# Patient Record
Sex: FEMALE | Race: WHITE | Employment: FULL TIME | URBAN - METROPOLITAN AREA
[De-identification: names, ages, dates, MRNs, and addresses within clinical notes are randomized per-mention and may not be internally consistent; named-entity substitution may affect disease eponyms.]

---

## 2022-08-17 ENCOUNTER — OFFICE VISIT (OUTPATIENT)
Dept: OBGYN CLINIC | Age: 42
End: 2022-08-17

## 2022-08-17 VITALS — BODY MASS INDEX: 25.57 KG/M2 | WEIGHT: 149.8 LBS | HEIGHT: 64 IN

## 2022-08-17 DIAGNOSIS — Z13.89 SCREENING FOR GENITOURINARY CONDITION: ICD-10-CM

## 2022-08-17 DIAGNOSIS — Z12.72 SCREENING FOR VAGINAL CANCER: ICD-10-CM

## 2022-08-17 DIAGNOSIS — Z12.31 ENCOUNTER FOR SCREENING MAMMOGRAM FOR BREAST CANCER: ICD-10-CM

## 2022-08-17 DIAGNOSIS — Z01.419 WELL WOMAN EXAM: Primary | ICD-10-CM

## 2022-08-17 LAB
BILIRUBIN, URINE, POC: ABNORMAL
BLOOD URINE, POC: ABNORMAL
GLUCOSE URINE, POC: ABNORMAL
KETONES, URINE, POC: ABNORMAL
LEUKOCYTE ESTERASE, URINE, POC: ABNORMAL
NITRITE, URINE, POC: POSITIVE
PH, URINE, POC: 5 (ref 4.6–8)
PROTEIN,URINE, POC: ABNORMAL
SPECIFIC GRAVITY, URINE, POC: 1.01 (ref 1–1.03)
URINALYSIS CLARITY, POC: ABNORMAL
URINALYSIS COLOR, POC: ABNORMAL
UROBILINOGEN, POC: ABNORMAL

## 2022-08-17 PROCEDURE — 81002 URINALYSIS NONAUTO W/O SCOPE: CPT | Performed by: OBSTETRICS & GYNECOLOGY

## 2022-08-17 PROCEDURE — 99396 PREV VISIT EST AGE 40-64: CPT | Performed by: OBSTETRICS & GYNECOLOGY

## 2022-08-17 RX ORDER — CIPROFLOXACIN 250 MG/1
250 TABLET, FILM COATED ORAL 2 TIMES DAILY
Qty: 14 TABLET | Refills: 0 | Status: SHIPPED | OUTPATIENT
Start: 2022-08-17 | End: 2022-08-24

## 2022-08-18 LAB
ESTRADIOL SERPL-MCNC: 71.57 PG/ML
T4 SERPL-MCNC: 7.5 UG/DL (ref 4.8–13.9)
TSH, 3RD GENERATION: 0.88 UIU/ML (ref 0.36–3.74)

## 2022-08-19 ENCOUNTER — TELEPHONE (OUTPATIENT)
Dept: OBGYN CLINIC | Age: 42
End: 2022-08-19

## 2022-08-19 LAB
BACTERIA SPEC CULT: ABNORMAL
SERVICE CMNT-IMP: ABNORMAL

## 2022-08-19 NOTE — TELEPHONE ENCOUNTER
----- Message from Adebayo Lake MD sent at 8/19/2022 12:32 PM EDT -----  Regarding: labs  All labs were wnl, except she did have a UTI, I gave her cipro and this will cover it.  Estrogen was wnl  ----- Message -----  From: Tim Melendrez Incoming [place] [facility]  Sent: 8/18/2022  11:44 AM EDT  To: Adebayo Lake MD

## 2022-08-20 LAB
TESTOST FREE SERPL-MCNC: 1.9 PG/ML (ref 0–4.2)
TESTOST SERPL-MCNC: 12 NG/DL (ref 4–50)

## 2022-08-23 ENCOUNTER — TELEPHONE (OUTPATIENT)
Dept: OBGYN CLINIC | Age: 42
End: 2022-08-23

## 2022-08-24 LAB
CYTOLOGIST CVX/VAG CYTO: NORMAL
CYTOLOGY CVX/VAG DOC THIN PREP: NORMAL
Lab: NORMAL
Lab: NORMAL
PATH REPORT.FINAL DX SPEC: NORMAL
STAT OF ADQ CVX/VAG CYTO-IMP: NORMAL

## 2022-09-01 ENCOUNTER — OFFICE VISIT (OUTPATIENT)
Dept: OBGYN CLINIC | Age: 42
End: 2022-09-01

## 2022-09-01 VITALS
DIASTOLIC BLOOD PRESSURE: 78 MMHG | SYSTOLIC BLOOD PRESSURE: 122 MMHG | WEIGHT: 147.8 LBS | BODY MASS INDEX: 25.23 KG/M2 | HEIGHT: 64 IN

## 2022-09-01 DIAGNOSIS — Z87.440 HISTORY OF UTI: ICD-10-CM

## 2022-09-01 DIAGNOSIS — R53.83 FATIGUE, UNSPECIFIED TYPE: Primary | ICD-10-CM

## 2022-09-01 LAB
BILIRUBIN, URINE, POC: NEGATIVE
BLOOD URINE, POC: NEGATIVE
GLUCOSE URINE, POC: NEGATIVE
KETONES, URINE, POC: NEGATIVE
LEUKOCYTE ESTERASE, URINE, POC: NEGATIVE
NITRITE, URINE, POC: NEGATIVE
PH, URINE, POC: 7 (ref 4.6–8)
PROTEIN,URINE, POC: NEGATIVE
SPECIFIC GRAVITY, URINE, POC: 1.02 (ref 1–1.03)
URINALYSIS CLARITY, POC: CLEAR
URINALYSIS COLOR, POC: YELLOW
UROBILINOGEN, POC: NORMAL

## 2022-09-01 PROCEDURE — 99214 OFFICE O/P EST MOD 30 MIN: CPT | Performed by: OBSTETRICS & GYNECOLOGY

## 2022-09-01 PROCEDURE — 81003 URINALYSIS AUTO W/O SCOPE: CPT | Performed by: OBSTETRICS & GYNECOLOGY

## 2022-09-01 NOTE — PROGRESS NOTES
The patient is a 36 y.o. [id number] who is seen to discuss HRT options. Pt had hormone levels checked on 8/17/22. Estradiol was (71.57) and Free Testosterone was (12). HISTORY:    [id number]  No LMP recorded (lmp unknown). Patient has had a hysterectomy. No current outpatient medications on file prior to visit. No current facility-administered medications on file prior to visit. ROS:  Feeling tired/fatigued. No dyspnea or chest pain on exertion. No abdominal pain, change in bowel habits, black or bloody stools. No urinary tract symptoms. GYN ROS: no breast pain or new or enlarging lumps on self exam.    PHYSICAL EXAM:  Height 5' 4\" (1.626 m), weight 147 lb 12.8 oz (67 kg), not currently breastfeeding. The patient appears well, alert, oriented x 3, in no distress. ASSESSMENT:    1. History of UTI  -     AMB POC URINALYSIS DIP STICK AUTO W/O MICRO     PLAN:  All questions answered, c/o cont fatigue.  Wants testosterone supplement  Blood tests: Basic metabolic panel, CBC with diff, and sedrate, cortisol  Diagnosis explained in detail, including differential  Follow-up as needed, urine wnl  Rx for testosterone troches 1mg q day # 30 5 refills

## 2022-09-02 LAB
ALBUMIN SERPL-MCNC: 4.2 G/DL (ref 3.5–5)
ALBUMIN/GLOB SERPL: 1.3 {RATIO} (ref 1.2–3.5)
ALP SERPL-CCNC: 46 U/L (ref 50–136)
ALT SERPL-CCNC: 18 U/L (ref 12–65)
ANION GAP SERPL CALC-SCNC: 3 MMOL/L (ref 4–13)
AST SERPL-CCNC: 6 U/L (ref 15–37)
BASOPHILS # BLD: 0 K/UL (ref 0–0.2)
BASOPHILS NFR BLD: 0 % (ref 0–2)
BILIRUB SERPL-MCNC: 0.5 MG/DL (ref 0.2–1.1)
BUN SERPL-MCNC: 9 MG/DL (ref 6–23)
CALCIUM SERPL-MCNC: 9.2 MG/DL (ref 8.3–10.4)
CHLORIDE SERPL-SCNC: 109 MMOL/L (ref 101–110)
CO2 SERPL-SCNC: 25 MMOL/L (ref 21–32)
CORTIS AM PEAK SERPL-MCNC: 5.9 UG/DL (ref 7–25)
CREAT SERPL-MCNC: 0.7 MG/DL (ref 0.6–1)
DIFFERENTIAL METHOD BLD: NORMAL
EOSINOPHIL # BLD: 0.1 K/UL (ref 0–0.8)
EOSINOPHIL NFR BLD: 1 % (ref 0.5–7.8)
ERYTHROCYTE [DISTWIDTH] IN BLOOD BY AUTOMATED COUNT: 12 % (ref 11.9–14.6)
GLOBULIN SER CALC-MCNC: 3.2 G/DL (ref 2.3–3.5)
GLUCOSE SERPL-MCNC: 91 MG/DL (ref 65–100)
HCT VFR BLD AUTO: 39.6 % (ref 35.8–46.3)
HGB BLD-MCNC: 12.9 G/DL (ref 11.7–15.4)
IMM GRANULOCYTES # BLD AUTO: 0 K/UL (ref 0–0.5)
IMM GRANULOCYTES NFR BLD AUTO: 0 % (ref 0–5)
LYMPHOCYTES # BLD: 2.1 K/UL (ref 0.5–4.6)
LYMPHOCYTES NFR BLD: 28 % (ref 13–44)
MCH RBC QN AUTO: 31.6 PG (ref 26.1–32.9)
MCHC RBC AUTO-ENTMCNC: 32.6 G/DL (ref 31.4–35)
MCV RBC AUTO: 97.1 FL (ref 79.6–97.8)
MONOCYTES # BLD: 0.4 K/UL (ref 0.1–1.3)
MONOCYTES NFR BLD: 5 % (ref 4–12)
NEUTS SEG # BLD: 5 K/UL (ref 1.7–8.2)
NEUTS SEG NFR BLD: 66 % (ref 43–78)
NRBC # BLD: 0 K/UL (ref 0–0.2)
PLATELET # BLD AUTO: 253 K/UL (ref 150–450)
PMV BLD AUTO: 10.7 FL (ref 9.4–12.3)
POTASSIUM SERPL-SCNC: 4.5 MMOL/L (ref 3.5–5.1)
PROT SERPL-MCNC: 7.4 G/DL (ref 6.3–8.2)
RBC # BLD AUTO: 4.08 M/UL (ref 4.05–5.2)
SODIUM SERPL-SCNC: 137 MMOL/L (ref 136–145)
WBC # BLD AUTO: 7.6 K/UL (ref 4.3–11.1)

## 2022-09-08 ENCOUNTER — TELEPHONE (OUTPATIENT)
Dept: OBGYN CLINIC | Age: 42
End: 2022-09-08

## 2022-09-08 NOTE — TELEPHONE ENCOUNTER
----- Message from Mayela Mercedes MD sent at 9/8/2022 11:18 AM EDT -----  Regarding: labs  All of her fatigue labs were wnl, so stay on the testosterone  ----- Message -----  From: Tim Melendrez Incoming [place] [facility]  Sent: 9/2/2022   7:29 AM EDT  To: Mayela Mercedes MD

## 2022-10-03 ENCOUNTER — NURSE ONLY (OUTPATIENT)
Dept: OBGYN CLINIC | Age: 42
End: 2022-10-03

## 2022-10-03 DIAGNOSIS — R30.0 BURNING WITH URINATION: ICD-10-CM

## 2022-10-03 DIAGNOSIS — M54.50 LOW BACK PAIN, UNSPECIFIED BACK PAIN LATERALITY, UNSPECIFIED CHRONICITY, UNSPECIFIED WHETHER SCIATICA PRESENT: ICD-10-CM

## 2022-10-03 DIAGNOSIS — R30.0 DYSURIA: ICD-10-CM

## 2022-10-03 DIAGNOSIS — R39.9 UTI SYMPTOMS: Primary | ICD-10-CM

## 2022-10-03 DIAGNOSIS — R39.9 UTI SYMPTOMS: ICD-10-CM

## 2022-10-03 LAB
BILIRUBIN, URINE, POC: NEGATIVE
BLOOD URINE, POC: NEGATIVE
GLUCOSE URINE, POC: NEGATIVE
KETONES, URINE, POC: NEGATIVE
LEUKOCYTE ESTERASE, URINE, POC: NEGATIVE
NITRITE, URINE, POC: NEGATIVE
PH, URINE, POC: 6.5 (ref 4.6–8)
PROTEIN,URINE, POC: NEGATIVE
SPECIFIC GRAVITY, URINE, POC: 1.03 (ref 1–1.03)
URINALYSIS CLARITY, POC: CLEAR
URINALYSIS COLOR, POC: YELLOW
UROBILINOGEN, POC: NORMAL

## 2022-10-03 PROCEDURE — 99211 OFF/OP EST MAY X REQ PHY/QHP: CPT | Performed by: OBSTETRICS & GYNECOLOGY

## 2022-10-03 PROCEDURE — 81003 URINALYSIS AUTO W/O SCOPE: CPT | Performed by: OBSTETRICS & GYNECOLOGY

## 2022-10-03 NOTE — PROGRESS NOTES
GYN patient is here for a urine check. She complains of UTI symptoms of painful urination, burning with urination and back pain. UA wan negative today in the office. Spoke with Holli Sorto, [facility] and she advised her to increase hydration and she can take AZO for the discomfort and burning. Urine sent for culture and she will be notified of results when they are available. Orders Placed This Encounter   Procedures    Culture, Urine     Standing Status:   Future     Standing Expiration Date:   10/3/2023     Order Specific Question:   Specify (ex-cath, midstream, cysto, etc)?      Answer:   Mid- stream    AMB POC URINALYSIS DIP STICK AUTO W/O MICRO

## 2022-10-06 LAB
BACTERIA SPEC CULT: NORMAL
SERVICE CMNT-IMP: NORMAL

## 2022-10-27 ENCOUNTER — NURSE ONLY (OUTPATIENT)
Dept: OBGYN CLINIC | Age: 42
End: 2022-10-27

## 2022-10-27 DIAGNOSIS — R39.9 UTI SYMPTOMS: Primary | ICD-10-CM

## 2022-10-27 DIAGNOSIS — R35.0 FREQUENCY OF URINATION: ICD-10-CM

## 2022-10-27 DIAGNOSIS — R39.15 URGENCY OF URINATION: ICD-10-CM

## 2022-10-27 DIAGNOSIS — R30.0 DYSURIA: ICD-10-CM

## 2022-10-27 LAB
BILIRUBIN, URINE, POC: NEGATIVE
BLOOD URINE, POC: NORMAL
GLUCOSE URINE, POC: NEGATIVE
KETONES, URINE, POC: NEGATIVE
LEUKOCYTE ESTERASE, URINE, POC: NEGATIVE
NITRITE, URINE, POC: NEGATIVE
PH, URINE, POC: 7 (ref 4.6–8)
PROTEIN,URINE, POC: NEGATIVE
SPECIFIC GRAVITY, URINE, POC: 1.01 (ref 1–1.03)
URINALYSIS CLARITY, POC: CLEAR
URINALYSIS COLOR, POC: YELLOW
UROBILINOGEN, POC: NORMAL

## 2022-10-27 PROCEDURE — 81003 URINALYSIS AUTO W/O SCOPE: CPT | Performed by: OBSTETRICS & GYNECOLOGY

## 2022-10-27 NOTE — PROGRESS NOTES
GYN Patient is here for a urine check. She complains of frequency, urgency and dysuria. Urine sent for culture. UA today showed trace blood. We will call her with results when they become available. If culture is negative we will send a referral.  Orders Placed This Encounter   Procedures    Culture, Urine     Standing Status:   Future     Standing Expiration Date:   10/27/2023     Order Specific Question:   Specify (ex-cath, midstream, cysto, etc)?      Answer:   midstream    AMB POC URINALYSIS DIP STICK AUTO W/O MICRO

## 2022-10-30 LAB
BACTERIA SPEC CULT: NORMAL
SERVICE CMNT-IMP: NORMAL

## 2023-02-14 NOTE — PROGRESS NOTES
HPI:  Ms. Ben Ramirez is a 36 y.o.   OB History          4    Para   4    Term   4            AB        Living   4         SAB        IAB        Ectopic        Molar        Multiple        Live Births   4             New patient who is here today for a well woman exam. Urinalysis was positive for UTI. Patient is symptomatic. Urine culture will be sent. Total Abdominal Hysterectomy without Salpingo-Oophorectomy on 3/6/13 due to menorrhagia and dysmenorrhea. Pathology:   \"UTERUS\": CERVIX: CHRONIC CERVICITIS AND SQUAMOUS METAPLASIA. ENDOMETRIUM: INACTIVE BASALIS-TYPE ENDOMETRIUM. MYOMETRIUM: HISTOLOGICALLY UNREMARKABLE MYOMETRIUM. Date Performed Result   PAP Several years  No hx of abnormal pap, colposcopy or leep. Mammogram   Benign    Colonoscopy NA NA   Dexa NA NA     GYN History     Past Medical History:  Past Medical History:   Diagnosis Date    Dysmenorrhea     Menorrhagia with regular cycle        Past Surgical History:  Past Surgical History:   Procedure Laterality Date     SECTION      HYSTERECTOMY, TOTAL ABDOMINAL (CERVIX REMOVED)  2013       Allergies: Allergies   Allergen Reactions    Oxycodone-Acetaminophen Itching       Medication History:  No current outpatient medications on file. No current facility-administered medications for this visit.        Social History:  Social History     Socioeconomic History    Marital status:      Spouse name: Not on file    Number of children: Not on file    Years of education: Not on file    Highest education level: Not on file   Occupational History    Not on file   Tobacco Use    Smoking status: Former     Types: Cigarettes    Smokeless tobacco: Never   Substance and Sexual Activity    Alcohol use: Yes    Drug use: Never    Sexual activity: Yes     Partners: Male     Birth control/protection: Surgical   Other Topics Concern    Not on file   Social History Narrative    Not on file     Social Determinants of Health     Financial Resource Strain: Not on file   Food Insecurity: Not on file   Transportation Needs: Not on file   Physical Activity: Not on file   Stress: Not on file   Social Connections: Not on file   Intimate Partner Violence: Not on file   Housing Stability: Not on file       Family History:  Family History   Problem Relation Age of Onset    Heart Attack Father     Breast Cancer Maternal Grandmother     Prostate Cancer Neg Hx     Ovarian Cancer Neg Hx     Colon Cancer Neg Hx        Review of Systems - General ROS: negative except for that discussed in HPI      ROS:  Feeling well. No dyspnea or chest pain on exertion. No abdominal pain, change in bowel habits, black or bloody stools. No urinary tract symptoms. No neurological complaints. Objective:   Ht 5' 4\" (1.626 m)   Wt 149 lb 12.8 oz (67.9 kg)   LMP  (LMP Unknown)   Breastfeeding No   BMI 25.71 kg/m²     Results for orders placed or performed in visit on 08/17/22   AMB POC URINALYSIS DIP STICK MANUAL W/O MICRO   Result Value Ref Range    Color (UA POC) Red     Clarity (UA POC) Cloudy     Glucose, Urine, POC Trace Negative    Bilirubin, Urine, POC 1+ Negative    Ketones, Urine, POC Trace Negative    Specific Gravity, Urine, POC 1.010 1.001 - 1.035    Blood (UA POC) 2+ Negative    pH, Urine, POC 5.0 4.6 - 8.0    Protein, Urine, POC 3+ Negative    Urobilinogen, POC 4 mg/dL     Nitrite, Urine, POC Positive Negative    Leukocyte Esterase, Urine, POC 3+ Negative        The patient appears well, alert, oriented x 3, in no distress. ENT normal.  Neck supple. No adenopathy or thyromegaly. Lungs:  clear, good air entry, no wheezes, rhonchi or rales. Heart:  S1 and S2 normal, no murmurs, regular rate and rhythm. Abdomen:  soft without tenderness, guarding, mass or organomegaly. Extremities show no edema, normal peripheral pulses. Neurological is normal, no focal findings.     BREAST EXAM: breasts appear normal, no suspicious masses, no skin or nipple changes or axillary nodes, symmetric fibrous changes bilaterally    PELVIC EXAM: External genitalia is within normal limits, urethra, urethra meatus and bladder are midline well supported. Vagina is well rugated, yeast present/ has a prominent polyp arising from right cuff corner( papped over this )Cervix absent, pap done  Uterus is absent,  no ovarian masses palpated    Assessment/Plan:      Diagnosis Orders   1. Well woman exam  AMB POC URINALYSIS DIP STICK MANUAL W/O MICRO    Testosterone, free, total    Estradiol    TSH    T4    T4    TSH    Estradiol    Testosterone, free, total      2. Screening for genitourinary condition  AMB POC URINALYSIS DIP STICK MANUAL W/O MICRO    Culture, Urine    Culture, Urine      3. Encounter for screening mammogram for breast cancer  YUE YAMILET DIGITAL SCREEN BILATERAL    YUE YAMILET DIGITAL SCREEN BILATERAL      4. Screening for vaginal cancer  PAP (Image Guided), Liquid-based    PAP (Image Guided), Liquid-based        Encounter Diagnoses   Name Primary? Well woman exam Yes    Screening for genitourinary condition     Encounter for screening mammogram for breast cancer     Screening for vaginal cancer      Orders Placed This Encounter   Procedures    Culture, Urine     Standing Status:   Future     Number of Occurrences:   1     Standing Expiration Date:   8/17/2023     Order Specific Question:   Specify (ex-cath, midstream, cysto, etc)?      Answer:   ccu    YUE YAMILET DIGITAL SCREEN BILATERAL     Standing Status:   Future     Number of Occurrences:   1     Standing Expiration Date:   10/16/2023     Order Specific Question:   Reason for exam:     Answer:   screening    Testosterone, free, total     Standing Status:   Future     Number of Occurrences:   1     Standing Expiration Date:   8/17/2023    Estradiol     Standing Status:   Future     Number of Occurrences:   1     Standing Expiration Date:   8/17/2023    TSH     Standing Status:   Future     Number of Occurrences:   1 Standing Expiration Date:   8/17/2023    T4     Standing Status:   Future     Number of Occurrences:   1     Standing Expiration Date:   8/17/2023    PAP (Image Guided), Liquid-based     Standing Status:   Future     Number of Occurrences:   1     Standing Expiration Date:   8/17/2023     Order Specific Question:   Pap Source? (Required)     Answer:   vaginal     Order Specific Question:   Pap collection method? (Required     Answer:   broom     Order Specific Question:   Menstrual Status ? Answer:   Hysterectomy [3]     Order Specific Question:   Previous Treatment? (Required)     Answer:   NONE    AMB POC URINALYSIS DIP STICK MANUAL W/O MICRO     Chao Bancroft was seen today for new patient and annual exam.    Diagnoses and all orders for this visit:    Well woman exam  -     AMB POC URINALYSIS DIP STICK MANUAL W/O MICRO  -     Testosterone, free, total; Future  -     Estradiol; Future  -     TSH; Future  -     T4; Future  -     T4  -     TSH  -     Estradiol  -     Testosterone, free, total    Screening for genitourinary condition  -     AMB POC URINALYSIS DIP STICK MANUAL W/O MICRO  -     Culture, Urine; Future  -     Culture, Urine    Encounter for screening mammogram for breast cancer  -     YUE YAMILET DIGITAL SCREEN BILATERAL; Future  -     YUE YAMILET DIGITAL SCREEN BILATERAL    Screening for vaginal cancer  -     PAP (Image Guided), Liquid-based; Future  -     PAP (Image Guided), Liquid-based    Other orders  -     ciprofloxacin (CIPRO) 250 MG tablet; Take 1 tablet by mouth 2 times daily for 7 days  -     terconazole (TERAZOL 3) 0.8 % vaginal cream; Place vaginally nightly.       Return in about 1 year (around 8/17/2023) for yearly physical.  the following changes in treatment are made: cipro for UTI, T3 for yeast  reviewed medications and side effects in detail    mammogram  pap smear q year for polyp- remove if enlarges or causes bleeding, or pap suspicious  return annually or prn Never smoker

## 2023-10-23 ENCOUNTER — OFFICE VISIT (OUTPATIENT)
Dept: OBGYN CLINIC | Age: 43
End: 2023-10-23

## 2023-10-23 VITALS — BODY MASS INDEX: 26.33 KG/M2 | WEIGHT: 153.4 LBS | DIASTOLIC BLOOD PRESSURE: 76 MMHG | SYSTOLIC BLOOD PRESSURE: 130 MMHG

## 2023-10-23 DIAGNOSIS — N73.9 PELVIC ABSCESS IN FEMALE: Primary | ICD-10-CM

## 2023-10-23 PROCEDURE — 99213 OFFICE O/P EST LOW 20 MIN: CPT | Performed by: NURSE PRACTITIONER

## 2023-10-23 RX ORDER — CEPHALEXIN 500 MG/1
CAPSULE ORAL
Qty: 30 CAPSULE | Refills: 0 | Status: SHIPPED | OUTPATIENT
Start: 2023-10-23

## 2023-10-23 RX ORDER — IBUPROFEN 200 MG
200 TABLET ORAL EVERY 6 HOURS PRN
COMMUNITY

## 2023-10-23 NOTE — PROGRESS NOTES
The patient is a 39 y.o. [id number] who is seen for pain to right side of mons pubis with noticeable lump that was first noticed 1 month ago. Per patient, over the weekend the area increased in size and pain. In addition notes has developed redness that has been spreading. Denies warmth. Denies fevers. She has tried applying warm compresses to area as well as tea tree oil and neosporin, with no improvements. Has had minimal changes. Area is currently quarter sized without drainage. Denies vaginal discharge/odor/itching. Denies pelvic/abd/flank pain. Denies urinary symptoms. Denies any new sexual partners    HISTORY:    [id number]  No LMP recorded (lmp unknown). Patient has had a hysterectomy. Current Outpatient Medications on File Prior to Visit   Medication Sig Dispense Refill    ibuprofen (ADVIL;MOTRIN) 200 MG tablet Take 1 tablet by mouth every 6 hours as needed for Pain       No current facility-administered medications on file prior to visit. ROS:  Feeling well. No dyspnea or chest pain on exertion. No abdominal pain, change in bowel habits, black or bloody stools. No urinary tract symptoms. GYN ROS: see above. PHYSICAL EXAM:  Blood pressure 130/76, weight 69.6 kg (153 lb 6.4 oz), not currently breastfeeding. The patient appears well, alert, oriented x 3, in no distress. Lungs are clear. Heart RRR, no murmurs. Abdomen soft without tenderness, guarding, mass or organomegaly. Pelvic: VULVA: MONS PUBIS WITH 1CM BUMP WITH APPROX 1.5IN OF SURROUNDING REDNESS; NOT WARM TO TOUCH;MINIMAL TENDERNESS; OTHERWISE    normal appearing vulva with no masses, tenderness or lesions. ASSESSMENT:  Encounter Diagnosis   Name Primary?     Pelvic abscess in female Yes       PLAN:  All questions answered  Keflex to pharmacy for abscess to mons pubis  Recheck 1 week  Continue Epsom salts soaks and warm compress   If worsening or failure to see improvements to call   Pt verbalized understanding and happy with plan

## 2023-10-30 RX ORDER — FLUCONAZOLE 150 MG/1
TABLET ORAL
Qty: 2 TABLET | Refills: 0 | Status: SHIPPED | OUTPATIENT
Start: 2023-10-30

## 2023-10-30 NOTE — PROGRESS NOTES
Orders Placed This Encounter    fluconazole (DIFLUCAN) 150 MG tablet     Sig: Take one tablet orally now and repeat in 72 hours.      Dispense:  2 tablet     Refill:  0      Verbal orders per Jacob's same name as above

## 2023-11-01 ENCOUNTER — OFFICE VISIT (OUTPATIENT)
Dept: OBGYN CLINIC | Age: 43
End: 2023-11-01

## 2023-11-01 VITALS
SYSTOLIC BLOOD PRESSURE: 112 MMHG | DIASTOLIC BLOOD PRESSURE: 68 MMHG | WEIGHT: 153.3 LBS | BODY MASS INDEX: 26.17 KG/M2 | HEIGHT: 64 IN

## 2023-11-01 DIAGNOSIS — N73.9 PELVIC ABSCESS IN FEMALE: Primary | ICD-10-CM

## 2023-11-01 PROCEDURE — 99213 OFFICE O/P EST LOW 20 MIN: CPT | Performed by: NURSE PRACTITIONER

## 2023-11-01 NOTE — PROGRESS NOTES
[employer]. ASSESSMENT:  Encounter Diagnosis   Name Primary? Pelvic abscess in female Yes       PLAN:  All questions answered  Continue abx until completion, despite resolving abscess  Continue epsom salts soaks x1-2 more weeks  Should return/worsen to call back for further eval  Pt verbalized understanding and happy with plan         Supervising physician is Dr. Cele Leung. Greater than 50% of the 20 minute visit were spent in counseling to the above topics.

## 2024-01-31 NOTE — PROGRESS NOTES
The patient is a 43 y.o.  who is seen for discuss hormones levels. Patient experiencing bad night sweat and getting agitated, constant moods swings, high anxiety and unable to sleep. For about 2-3 months. Wants to know what could be going wrong.     S/P hysterectomy many years ago, has both ovaries  HISTORY:      No LMP recorded (lmp unknown). Patient has had a hysterectomy.  Sexual History:  has sex with males  Contraception:  status post hysterectomy  Current Outpatient Medications on File Prior to Visit   Medication Sig Dispense Refill    ibuprofen (ADVIL;MOTRIN) 200 MG tablet Take 1 tablet by mouth every 6 hours as needed for Pain       No current facility-administered medications on file prior to visit.       ROS:  Feeling well. No dyspnea or chest pain on exertion.  No abdominal pain, change in bowel habits, black or bloody stools.  No urinary tract symptoms. GYN ROS: no breast pain or new or enlarging lumps on self exam.    PHYSICAL EXAM:  Blood pressure 128/76, height 1.626 m (5' 4\"), weight 68.7 kg (151 lb 8 oz), not currently breastfeeding.    The patient appears well, alert, oriented x 3, in no distress.      ASSESSMENT:    Menopausal sxs, only 44 yo     PLAN:  All questions answered, wants to start a low dose zoloft- 50mg sent  Blood tests: Estradiol, FSH, and LH and Test  Diagnosis explained in detail, including differential  Follow-up as needed

## 2024-02-01 ENCOUNTER — OFFICE VISIT (OUTPATIENT)
Dept: OBGYN CLINIC | Age: 44
End: 2024-02-01

## 2024-02-01 ENCOUNTER — PATIENT MESSAGE (OUTPATIENT)
Dept: OBGYN CLINIC | Age: 44
End: 2024-02-01

## 2024-02-01 VITALS
BODY MASS INDEX: 25.86 KG/M2 | HEIGHT: 64 IN | SYSTOLIC BLOOD PRESSURE: 128 MMHG | WEIGHT: 151.5 LBS | DIASTOLIC BLOOD PRESSURE: 76 MMHG

## 2024-02-01 DIAGNOSIS — Z78.0 MENOPAUSE: Primary | ICD-10-CM

## 2024-02-01 LAB
ESTRADIOL SERPL-MCNC: 136.31 PG/ML
FSH SERPL-ACNC: 9.5 MIU/ML
LH SERPL-ACNC: 19.6 MIU/ML

## 2024-02-01 PROCEDURE — 99213 OFFICE O/P EST LOW 20 MIN: CPT | Performed by: OBSTETRICS & GYNECOLOGY

## 2024-02-05 LAB — TESTOST SERPL-MCNC: 18.5 NG/DL

## 2024-02-06 RX ORDER — NITROFURANTOIN 25; 75 MG/1; MG/1
CAPSULE ORAL
Qty: 30 CAPSULE | Refills: 1 | Status: SHIPPED | OUTPATIENT
Start: 2024-02-06

## 2024-02-06 NOTE — TELEPHONE ENCOUNTER
Prescription sent to pharmacy per verbal orders by Dr. Qureshi.    Orders Placed This Encounter    nitrofurantoin, macrocrystal-monohydrate, (MACROBID) 100 MG capsule     Sig: Take one tablet after intercourse and repeat in 12 hours as needed to prevent UTI     Dispense:  30 capsule     Refill:  1

## 2024-02-09 RX ORDER — CIPROFLOXACIN 250 MG/1
TABLET, FILM COATED ORAL
Qty: 30 TABLET | Refills: 1 | Status: SHIPPED | OUTPATIENT
Start: 2024-02-09

## 2024-02-09 NOTE — TELEPHONE ENCOUNTER
Spoke with Dr. Qureshi.  Ok to d/c Macrobid and call in Cipro 250mg with same directions and quantity as Macrobid prescription.  New prescription sent to pharmacy on file.  Macrobid added to allergy list.  Apto message sent to patient.    Orders Placed This Encounter    nitrofurantoin, macrocrystal-monohydrate, (MACROBID) 100 MG capsule     Sig: Take one tablet after intercourse and repeat in 12 hours as needed to prevent UTI     Dispense:  30 capsule     Refill:  1    ciprofloxacin (CIPRO) 250 MG tablet     Sig: Take one tablet after intercourse and repeat in 12 hours as needed to prevent UTI     Dispense:  30 tablet     Refill:  1

## 2024-02-12 LAB
TESTOST FREE SERPL-MCNC: <0.2 PG/ML (ref 0–4.2)
TESTOST SERPL-MCNC: 18.5 NG/DL

## 2024-07-28 ENCOUNTER — PATIENT MESSAGE (OUTPATIENT)
Dept: OBGYN CLINIC | Age: 44
End: 2024-07-28

## 2024-07-29 RX ORDER — CIPROFLOXACIN 250 MG/1
TABLET, FILM COATED ORAL
Qty: 30 TABLET | Refills: 1 | OUTPATIENT
Start: 2024-07-29

## 2024-07-29 RX ORDER — CIPROFLOXACIN 250 MG/1
TABLET, FILM COATED ORAL
Qty: 30 TABLET | Refills: 1 | Status: SHIPPED | OUTPATIENT
Start: 2024-07-29

## 2024-07-29 NOTE — TELEPHONE ENCOUNTER
Prescription refills sent to pharmacy per verbal orders by Dr. Qureshi.    Orders Placed This Encounter    ciprofloxacin (CIPRO) 250 MG tablet     Sig: Take one tablet after intercourse and repeat in 12 hours as needed to prevent UTI     Dispense:  30 tablet     Refill:  1